# Patient Record
Sex: MALE | Race: WHITE | URBAN - METROPOLITAN AREA
[De-identification: names, ages, dates, MRNs, and addresses within clinical notes are randomized per-mention and may not be internally consistent; named-entity substitution may affect disease eponyms.]

---

## 2023-04-12 ENCOUNTER — TELEPHONE (OUTPATIENT)
Dept: PRIMARY CARE | Facility: CLINIC | Age: 88
End: 2023-04-12

## 2023-04-12 NOTE — TELEPHONE ENCOUNTER
Patient's son is calling the patient has been moved to hospice care and the son is looking for some help with how to go about doing the [facility] paperwork.  He can be reached at [phone].

## 2023-05-02 ENCOUNTER — TELEPHONE (OUTPATIENT)
Dept: PRIMARY CARE | Facility: CLINIC | Age: 88
End: 2023-05-02

## 2023-05-02 NOTE — TELEPHONE ENCOUNTER
Bindu with [facility] is calling stating that a request was sent in the system to sign off on the death certificate that the patient passed away on 23.  She can be reached at [phone].    Thank You